# Patient Record
Sex: MALE | Race: WHITE
[De-identification: names, ages, dates, MRNs, and addresses within clinical notes are randomized per-mention and may not be internally consistent; named-entity substitution may affect disease eponyms.]

---

## 2017-10-18 ENCOUNTER — HOSPITAL ENCOUNTER (OUTPATIENT)
Dept: HOSPITAL 45 - C.LABSPEC | Age: 57
Discharge: HOME | End: 2017-10-18
Attending: INTERNAL MEDICINE
Payer: COMMERCIAL

## 2017-10-18 DIAGNOSIS — E78.5: ICD-10-CM

## 2017-10-18 DIAGNOSIS — R53.83: Primary | ICD-10-CM

## 2017-10-18 DIAGNOSIS — Z11.59: ICD-10-CM

## 2017-10-18 DIAGNOSIS — R30.0: ICD-10-CM

## 2017-10-18 LAB
ALBUMIN/GLOB SERPL: 1.3 {RATIO} (ref 0.9–2)
ALP SERPL-CCNC: 79 U/L (ref 45–117)
ALT SERPL-CCNC: 23 U/L (ref 12–78)
ANION GAP SERPL CALC-SCNC: 7 MMOL/L (ref 3–11)
AST SERPL-CCNC: 19 U/L (ref 15–37)
BASOPHILS # BLD: 0.03 K/UL (ref 0–0.2)
BASOPHILS NFR BLD: 0.5 %
BUN SERPL-MCNC: 13 MG/DL (ref 7–18)
BUN/CREAT SERPL: 13 (ref 10–20)
CALCIUM SERPL-MCNC: 8.9 MG/DL (ref 8.5–10.1)
CHLORIDE SERPL-SCNC: 106 MMOL/L (ref 98–107)
CHOLEST/HDLC SERPL: 4.6 {RATIO}
CO2 SERPL-SCNC: 26 MMOL/L (ref 21–32)
COMPLETE: YES
CREAT SERPL-MCNC: 1 MG/DL (ref 0.6–1.4)
EOSINOPHIL NFR BLD AUTO: 158 K/UL (ref 130–400)
GLOBULIN SER-MCNC: 3.4 GM/DL (ref 2.5–4)
GLUCOSE SERPL-MCNC: 102 MG/DL (ref 70–99)
GLUCOSE UR QL: 37 MG/DL
HCT VFR BLD CALC: 46.1 % (ref 42–52)
IG%: 0.2 %
IMM GRANULOCYTES NFR BLD AUTO: 24.9 %
LYMPHOCYTES # BLD: 1.36 K/UL (ref 1.2–3.4)
MCH RBC QN AUTO: 29.9 PG (ref 25–34)
MCHC RBC AUTO-ENTMCNC: 34.9 G/DL (ref 32–36)
MCV RBC AUTO: 85.5 FL (ref 80–100)
MONOCYTES NFR BLD: 6.8 %
NEUTROPHILS # BLD AUTO: 2.4 %
NEUTROPHILS NFR BLD AUTO: 65.2 %
NITRITE UR QL STRIP: 121 MG/DL (ref 0–150)
PH UR: 169 MG/DL (ref 0–200)
PMV BLD AUTO: 10.2 FL (ref 7.4–10.4)
POTASSIUM SERPL-SCNC: 4.2 MMOL/L (ref 3.5–5.1)
PSA SERPL-MCNC: 0.27 NG/ML (ref 0–4)
RBC # BLD AUTO: 5.39 M/UL (ref 4.7–6.1)
SODIUM SERPL-SCNC: 139 MMOL/L (ref 136–145)
TSH SERPL-ACNC: 1.87 UIU/ML (ref 0.3–4.5)
VERY LOW DENSITY LIPOPROT CALC: 24 MG/DL
WBC # BLD AUTO: 5.46 K/UL (ref 4.8–10.8)

## 2018-03-22 ENCOUNTER — HOSPITAL ENCOUNTER (INPATIENT)
Dept: HOSPITAL 45 - C.EDB | Age: 58
LOS: 2 days | Discharge: HOME | DRG: 552 | End: 2018-03-24
Attending: HOSPITALIST | Admitting: HOSPITALIST
Payer: COMMERCIAL

## 2018-03-22 VITALS
OXYGEN SATURATION: 98 % | SYSTOLIC BLOOD PRESSURE: 133 MMHG | HEART RATE: 57 BPM | DIASTOLIC BLOOD PRESSURE: 82 MMHG | TEMPERATURE: 98.24 F

## 2018-03-22 VITALS
WEIGHT: 301.15 LBS | HEIGHT: 73 IN | BODY MASS INDEX: 39.91 KG/M2 | BODY MASS INDEX: 39.91 KG/M2 | HEIGHT: 73 IN | WEIGHT: 301.15 LBS

## 2018-03-22 VITALS
DIASTOLIC BLOOD PRESSURE: 71 MMHG | SYSTOLIC BLOOD PRESSURE: 134 MMHG | OXYGEN SATURATION: 98 % | TEMPERATURE: 98.6 F | HEART RATE: 78 BPM

## 2018-03-22 DIAGNOSIS — G95.9: ICD-10-CM

## 2018-03-22 DIAGNOSIS — M47.26: ICD-10-CM

## 2018-03-22 DIAGNOSIS — R27.0: ICD-10-CM

## 2018-03-22 DIAGNOSIS — M54.5: ICD-10-CM

## 2018-03-22 DIAGNOSIS — G89.29: ICD-10-CM

## 2018-03-22 DIAGNOSIS — M47.22: Primary | ICD-10-CM

## 2018-03-22 DIAGNOSIS — M47.814: ICD-10-CM

## 2018-03-22 DIAGNOSIS — M50.121: ICD-10-CM

## 2018-03-22 LAB
ALBUMIN SERPL-MCNC: 4.3 GM/DL (ref 3.4–5)
ALP SERPL-CCNC: 87 U/L (ref 45–117)
ALT SERPL-CCNC: 24 U/L (ref 12–78)
AST SERPL-CCNC: 17 U/L (ref 15–37)
BASOPHILS # BLD: 0.05 K/UL (ref 0–0.2)
BASOPHILS NFR BLD: 0.8 %
BUN SERPL-MCNC: 11 MG/DL (ref 7–18)
CALCIUM SERPL-MCNC: 9.5 MG/DL (ref 8.5–10.1)
CK MB SERPL-MCNC: 2.8 NG/ML (ref 0.5–3.6)
CO2 SERPL-SCNC: 30 MMOL/L (ref 21–32)
CREAT SERPL-MCNC: 1.03 MG/DL (ref 0.6–1.4)
EOS ABS #: 0.07 K/UL (ref 0–0.5)
EOSINOPHIL NFR BLD AUTO: 144 K/UL (ref 130–400)
GLUCOSE SERPL-MCNC: 107 MG/DL (ref 70–99)
HCT VFR BLD CALC: 47.6 % (ref 42–52)
HGB BLD-MCNC: 17.1 G/DL (ref 14–18)
IG#: 0.01 K/UL (ref 0–0.02)
IMM GRANULOCYTES NFR BLD AUTO: 21.9 %
INR PPP: 1 (ref 0.9–1.1)
LYMPHOCYTES # BLD: 1.29 K/UL (ref 1.2–3.4)
MCH RBC QN AUTO: 30.9 PG (ref 25–34)
MCHC RBC AUTO-ENTMCNC: 35.9 G/DL (ref 32–36)
MCV RBC AUTO: 85.9 FL (ref 80–100)
MONO ABS #: 0.62 K/UL (ref 0.11–0.59)
MONOCYTES NFR BLD: 10.5 %
NEUT ABS #: 3.85 K/UL (ref 1.4–6.5)
NEUTROPHILS # BLD AUTO: 1.2 %
NEUTROPHILS NFR BLD AUTO: 65.4 %
PMV BLD AUTO: 9.5 FL (ref 7.4–10.4)
POTASSIUM SERPL-SCNC: 4 MMOL/L (ref 3.5–5.1)
PROT SERPL-MCNC: 7.8 GM/DL (ref 6.4–8.2)
PTT PATIENT: 24.7 SECONDS (ref 21–31)
RED CELL DISTRIBUTION WIDTH CV: 12.7 % (ref 11.5–14.5)
RED CELL DISTRIBUTION WIDTH SD: 40.4 FL (ref 36.4–46.3)
SODIUM SERPL-SCNC: 138 MMOL/L (ref 136–145)
WBC # BLD AUTO: 5.89 K/UL (ref 4.8–10.8)

## 2018-03-22 RX ADMIN — IBUPROFEN SCH MG: 600 TABLET, FILM COATED ORAL at 16:05

## 2018-03-22 RX ADMIN — SODIUM CHLORIDE SCH MLS/HR: 900 INJECTION, SOLUTION INTRAVENOUS at 05:12

## 2018-03-22 RX ADMIN — SODIUM CHLORIDE SCH MLS/HR: 900 INJECTION, SOLUTION INTRAVENOUS at 12:45

## 2018-03-22 RX ADMIN — IBUPROFEN SCH MG: 600 TABLET, FILM COATED ORAL at 20:20

## 2018-03-22 NOTE — HISTORY AND PHYSICAL
History & Physical


Date & Time of Service:


Mar 22, 2018 at 10:12


Chief Complaint:


Lwr Body Numb,Like Falling Asleep,Loosing Strength


Primary Care Physician:


Carlos Justice M.D.


History of Present Illness


Mr. Joshua was doing a lot of outdoor work that involved twisting, bending and 

lifting which he is not used to and then Sun, Mon, Tuesday he was driving 4-8 

hours a day. He does usually experience the feeling of legs "falling asleep" 

when he drives however it usually clears up after getting up out of the car. 

This time the feeling persists. He had some relief after seeing a chiropractor 

yesterday. Numbness went from both legs to just left leg. He feels like his leg 

might give out when he walks. He really has no pain and has taking Aleve last 

night and yesterday. No loss of control of bowel or bladder. He is eating and 

drinking normally. 





ROS


Constitutional: no chills, aches, sweats or fever


Respiratory: no sob,cough, sputum, or wheezing


Cardiac: no chest pain, palpitations, edema, orthopnea or lightheadedness


GI: no abdominal pain, nausea, vomiting, diarrhea or constipation


: no dysuria or hesitancy


Extremities: see HPI


Skin: no rash





All other systems reviewed and negative





Past Medical/Surgical History


Pmhx 18 years ago for chronic hydronephrosis, polyps on colonoscopy,





Family History


Father has htn, DMII 


Mother has A.fib and gout





Social History


Smoking Status:  Never Smoker


Smokeless Tobacco Use:  No


Alcohol Use:  none


Drug Use:  none


Marital Status:  


Housing status:  lives with family





Immunizations


History of Influenza Vaccine:  Yes


History of Tetanus Vaccine?:  Yes


History of Pneumococcal:  No


History of Hepatitis B Vaccine:  Yes





Allergies


Coded Allergies:  


     No Known Allergies (Verified , 2/13/03)





Home Medications


Scheduled


Ascorbic Acid (Ascorbic Acid), 500 MG PO DAILY


Fish Oil (Omega-3), 1 CAP PO DAILY


Multivitamin (Multivitamin), 1 TAB PO DAILY





Physical Exam


Vital Signs











  Date Time  Temp Pulse Resp B/P (MAP) Pulse Ox O2 Delivery O2 Flow Rate FiO2


 


3/22/18 09:32  67 20 137/62 97 Room Air  


 


3/22/18 08:09  66 18 127/82 98 Room Air  


 


3/22/18 07:27  76 18 129/72 95 Room Air  


 


3/22/18 06:00  62 16 152/88 96 Room Air  


 


3/22/18 05:41  64 18 146/79 98 Room Air  


 


3/22/18 05:37     99 Room Air  


 


3/22/18 05:15  78      


 


3/22/18 04:54 37.0 73 20 148/83 98 Room Air  











General: no distress


Eyes: normal inspection, PERLL


Respiratory: chest non tender, clear to auscultation, normal breath sounds, no 

respiratory distress, no accessory muscle use


Cardiac: regular rate and rhythm, no rub or gallop, no murmur, no edema, no jvd


GI/: active bowel sounds, no abd pain or tenderness, soft, non distended


Extremities: left leg with decreased rom/strength, 1/10 tenderness to palpation 

left hip/buttock, no pain or masses to palpation along spine or adjacent 

musculature, normal strength, non tender, uneven gait, able to walk on heels 

and toes though a bit wobbly with more difficulty on left than right. Able to 

feel palpation on left leg though somewhat numb with pins and needles,


Neuro/Psych: alert and oriented x 3, normal mood and affect, CN II-XII intact, 

lower extremity reflexes wnl and equal 


Skin: normal color, dry





Diagnostics


Laboratory Results





Results Past 24 Hours








Test


  3/22/18


05:10 3/22/18


09:28 Range/Units


 


 


White Blood Count 5.89  4.8-10.8  K/uL


 


Red Blood Count 5.54  4.7-6.1  M/uL


 


Hemoglobin 17.1  14.0-18.0  g/dL


 


Hematocrit 47.6  42-52  %


 


Mean Corpuscular Volume 85.9    fL


 


Mean Corpuscular Hemoglobin 30.9  25-34  pg


 


Mean Corpuscular Hemoglobin


Concent 35.9


  


  32-36  g/dl


 


 


Platelet Count 144  130-400  K/uL


 


Mean Platelet Volume 9.5  7.4-10.4  fL


 


Neutrophils (%) (Auto) 65.4   %


 


Lymphocytes (%) (Auto) 21.9   %


 


Monocytes (%) (Auto) 10.5   %


 


Eosinophils (%) (Auto) 1.2   %


 


Basophils (%) (Auto) 0.8   %


 


Neutrophils # (Auto) 3.85  1.4-6.5  K/uL


 


Lymphocytes # (Auto) 1.29  1.2-3.4  K/uL


 


Monocytes # (Auto) 0.62  0.11-0.59  K/uL


 


Eosinophils # (Auto) 0.07  0-0.5  K/uL


 


Basophils # (Auto) 0.05  0-0.2  K/uL


 


RDW Standard Deviation 40.4  36.4-46.3  fL


 


RDW Coefficient of Variation 12.7  11.5-14.5  %


 


Immature Granulocyte % (Auto) 0.2   %


 


Immature Granulocyte # (Auto) 0.01  0.00-0.02  K/uL


 


Prothrombin Time


  10.7


  


  9.0-12.0


SECONDS


 


Prothromb Time International


Ratio 1.0


  


  0.9-1.1  


 


 


Activated Partial


Thromboplast Time 24.7


  


  21.0-31.0


SECONDS


 


Partial Thromboplastin Ratio 1.0   


 


Sodium Level 138  136-145  mmol/L


 


Potassium Level 4.0  3.5-5.1  mmol/L


 


Chloride Level 103    mmol/L


 


Carbon Dioxide Level 30  21-32  mmol/L


 


Anion Gap 5.0  3-11  mmol/L


 


Blood Urea Nitrogen 11  7-18  mg/dl


 


Creatinine


  1.03


  


  0.60-1.40


mg/dl


 


Est Creatinine Clear Calc


Drug Dose 116.2


  


   ml/min


 


 


Estimated GFR (


American) 93.7


  


   


 


 


Estimated GFR (Non-


American 80.8


  


   


 


 


BUN/Creatinine Ratio 10.6  10-20  


 


Random Glucose 107  70-99  mg/dl


 


Calcium Level 9.5  8.5-10.1  mg/dl


 


Magnesium Level 2.3  1.8-2.4  mg/dl


 


Total Bilirubin 0.6  0.2-1  mg/dl


 


Direct Bilirubin 0.1  0-0.2  mg/dl


 


Aspartate Amino Transf


(AST/SGOT) 17


  


  15-37  U/L


 


 


Alanine Aminotransferase


(ALT/SGPT) 24


  


  12-78  U/L


 


 


Alkaline Phosphatase 87    U/L


 


Total Creatine Kinase 222    U/L


 


Creatine Kinase MB 2.8  0.5-3.6  ng/ml


 


Creatine Kinase MB Ratio 1.3  0-3.0  


 


Troponin I < 0.015  0-0.045  ng/ml


 


Total Protein 7.8  6.4-8.2  gm/dl


 


Albumin 4.3  3.4-5.0  gm/dl


 


Urine Color  YELLOW  


 


Urine Appearance  CLEAR CLEAR  


 


Urine pH  7.5 4.5-7.5  


 


Urine Specific Gravity  1.012 1.000-1.030  


 


Urine Protein  NEG NEG  


 


Urine Glucose (UA)  NEG NEG  


 


Urine Ketones  NEG NEG  


 


Urine Occult Blood  NEG NEG  


 


Urine Nitrite  NEG NEG  


 


Urine Bilirubin  NEG NEG  


 


Urine Urobilinogen  NEG NEG  


 


Urine Leukocyte Esterase  NEG NEG  











Impression


Assessment and Plan


Mr. Joshua is a 57 year old man here for left leg weakness





Left leg weakness


- admit obs med surg


- MRI T spine and C spine as patient has loss of sensation on right abdomen as 

well as left leg


- consult neuro


- PT/OT


- ibuprofen, tylenol for pain





Chronic left side hydronephrosis


- apparent since 2001, no need for acute intervention





Full code 


SCDs





Advanced Directives


Existing Advance Directive:  No


Existing Living Will:  No


Existing Power of :  No


Existing Health Care Proxy:  No





Resuscitation Status


full code





VTE Prophylaxis


Will order VTE Prophylaxis:  Yes





Reviewed:  Pt Seen/Exam by Me


History


CRNP supervision Note:





I interviewed and examined the patient. Discussed with SHERON Kaur and agree 

with findings and plan as documented in the note. Any exceptions or 

clarifications are listed here:





Patient is a 57-year-old male who presents with acute onset yesterday of left 

lower extremity weakness, and bilateral numbness and tingling in the lower 

extremities.  He is also had some pain in the left lower back that radiates 

through the left buttocks to the hip.  He reports taking several long car rides 

in the last week and typically gets paresthesias in his bilateral legs with 

long car rides that goes away when he gets up and walks around.  He did not 

have any long car rides yesterday and this occurred.  He went to the 

chiropractor and had his lower back manipulated which did help relieve the 

symptoms for a couple of hours, but then they returned.  He feels great 

difficulty in walking and feels like he is going to have his left leg give out.

  He denies neck pain, denies fevers or chills, denies headache.  He has no 

symptoms that he is aware of in his upper extremities.  He has no visual changes

, no sore throat, no shortness of breath or cough, no chest pain, no abdominal 

pain, no changes in bowel habits, no dysuria.





Other past history as above





Vital signs reviewed


Gen: AAOx3, NAD


HEENT: anicteric sclerae, EOMI


CV: RRR no mgr nl S1S2


Pulm: CTAB no wcr


Abd: +BS soft NT ND no masses or hernias


Ext: no edema, 2+ DP pulses


Skin: no rashes, warm/dry


Neuro: 5 out of 5 strength in the bilateral upper extremities, right lower 

extremity with 5 out of 5 strength throughout, left lower extremity with 4 out 

of 5 strength in the hip flexors, with knee extension and flexion, and great 

toe dorsiflexion; sensation intact to light touch throughout upper and lower 

extremities, but with pinprick testing, he is decreased on the right in the 

entire right lower extremity, as well as all the way up his anterior torso to 

just below the nipple around T5, he also has decreased sensation to pinprick in 

the right fifth finger and forearm, but is intact to pinprick at the right 

deltoid muscle; DTRs-brisk at 2-3+ in the patellar and Achilles bilaterally, as 

well as 2+ in the left biceps/triceps/brachial radialis, but unable to elicit 

DTR in the right biceps triceps and brachial radialis, gait is somewhat ataxic





All laboratory values reviewed and are within normal limits with regards to CBC

, PRP, TSH, Lyme disease negative, ESR normal, CRP normal, troponin negative, 

LFTs negative or normal, INR normal


MRI of the lumbar spine with some mild bulging disks but nothing significant





Patient is a 57-year-old male here with left lower extremity weakness, sensory 

deficit to the level of approximately T5 on the right side of his body, ataxia, 

concerning for thoracic or cervical spine lesion.





-Recommended to check cervical and thoracic spine MRIs with and without 

contrast to assess for spinal cord lesion-at the time of this dictation, the 

results were back and showed a lesion at the left C6-7 spinal cord level that 

was 6 x 3 mm that was not enhancing on postcontrast images.  This could be 

inflammatory versus encephalomalacia or demyelinating process


-We will check brain MRI with and without contrast


-We will make n.p.o. after midnight for LP in the morning-check routine studies 

as well as Lyme, MS panel


-Consider starting IV Solu-Medrol 1 g daily 3 days after LP as per neurology 

recommendations


-Discussed case with neurology, Dr. Carrington, at length on the phone with my findings


-Follow-up on RPR and B12 levels when available





Documented By:  Ruth Orozco

## 2018-03-22 NOTE — MEDICAL CONSULT
Consultation


Date of Consultation:


Mar 22, 2018.


Attending Physician:





Reason for Consultation:


left leg numbness/weakness


History of Present Illness


Mr. Joshua was doing a lot of outdoor work that involved twisting, bending and 

lifting which he is not used to and then Sun, Mon, Tuesday he was driving 4-8 

hours a day. He does usually experience the feeling of legs "falling asleep" 

when he drives however it usually clears up after getting up out of the car. 

This time the feeling persists. He had some relief after seeing a chiropractor 

yesterday. Numbness went from both legs to just left leg. He feels like his leg 

might give out when he walks. He really has no pain and has taking Aleve last 

night and yesterday. No loss of control of bowel or bladder. He is eating and 

drinking normally. 





ROS


Constitutional: no chills, aches, sweats or fever


Respiratory: no sob,cough, sputum, or wheezing


Cardiac: no chest pain, palpitations, edema, orthopnea or lightheadedness


GI: no abdominal pain, nausea, vomiting, diarrhea or constipation


: no dysuria or hesitancy


Extremities: see HPI


Skin: no rash





All other systems reviewed and negative





Past Medical/Surgical History


Pmhx 18 years ago for chronic hydronephrosis, polyps on colonoscopy,





Family History


Father has htn, DMII 


Mother has A.fib and gout





Social History


Smoking Status:  Never Smoker


Smokeless Tobacco Use:  No


Alcohol Use:  none


Drug Use:  none


Marital Status:  


Housing status:  lives with family





Immunizations


History of Influenza Vaccine:  Yes


History of Tetanus Vaccine?:  Yes


History of Pneumococcal:  No


History of Hepatitis B Vaccine:  Yes





Allergies


Coded Allergies:  


     No Known Allergies (Verified , 2/13/03)





Home Medications


Scheduled


Ascorbic Acid (Ascorbic Acid), 500 MG PO DAILY


Fish Oil (Omega-3), 1 CAP PO DAILY


Multivitamin (Multivitamin), 1 TAB PO DAILY





Social History


Smoking Status:  Never Smoker





Allergies


Coded Allergies:  


     No Known Allergies (Verified , 2/13/03)





Home Medications


Fish oil, multivitamin, vitamin C





Current Inpatient Medications





Current Inpatient Medications








 Medications


  (Trade)  Dose


 Ordered  Sig/Angel Luis


 Route  Start Time


 Stop Time Status Last Admin


Dose Admin


 


 Sodium Chloride  1,000 ml @ 


 50 mls/hr  Q20H


 IV  3/22/18 05:12


 4/21/18 05:11  3/22/18 05:12


50 MLS/HR











Physical Exam











  Date Time  Temp Pulse Resp B/P (MAP) Pulse Ox O2 Delivery O2 Flow Rate FiO2


 


3/22/18 09:32  67 20 137/62 97 Room Air  


 


3/22/18 08:09  66 18 127/82 98 Room Air  


 


3/22/18 07:27  76 18 129/72 95 Room Air  


 


3/22/18 06:00  62 16 152/88 96 Room Air  


 


3/22/18 05:41  64 18 146/79 98 Room Air  


 


3/22/18 05:37     99 Room Air  


 


3/22/18 05:15  78      


 


3/22/18 04:54 37.0 73 20 148/83 98 Room Air  








General: no distress


Eyes: normal inspection, PERLL


Respiratory: chest non tender, clear to auscultation, normal breath sounds, no 

respiratory distress, no accessory muscle use


Cardiac: regular rate and rhythm, no rub or gallop, no murmur, no edema, no jvd


GI/: active bowel sounds, no abd pain or tenderness, soft, non distended


Extremities: left leg with decreased rom/strength, 1/10 tenderness to palpation 

left hip/buttock, no pain or masses to palpation along spine or adjacent 

musculature, normal strength, non tender, uneven gait, able to walk on heels 

and toes though a bit wobbly with more difficulty on left than right. Able to 

feel palpation on left leg though somewhat numb with pins and needles,


Neuro/Psych: alert and oriented x 3, normal mood and affect, CN II-XII intact, 

lower extremity reflexes wnl and equal 


Skin: normal color, dry





Laboratory Results





Last 24 Hours








Test


  3/22/18


05:10 3/22/18


09:28


 


White Blood Count 5.89 K/uL  


 


Red Blood Count 5.54 M/uL  


 


Hemoglobin 17.1 g/dL  


 


Hematocrit 47.6 %  


 


Mean Corpuscular Volume 85.9 fL  


 


Mean Corpuscular Hemoglobin 30.9 pg  


 


Mean Corpuscular Hemoglobin


Concent 35.9 g/dl 


  


 


 


Platelet Count 144 K/uL  


 


Mean Platelet Volume 9.5 fL  


 


Neutrophils (%) (Auto) 65.4 %  


 


Lymphocytes (%) (Auto) 21.9 %  


 


Monocytes (%) (Auto) 10.5 %  


 


Eosinophils (%) (Auto) 1.2 %  


 


Basophils (%) (Auto) 0.8 %  


 


Neutrophils # (Auto) 3.85 K/uL  


 


Lymphocytes # (Auto) 1.29 K/uL  


 


Monocytes # (Auto) 0.62 K/uL  


 


Eosinophils # (Auto) 0.07 K/uL  


 


Basophils # (Auto) 0.05 K/uL  


 


RDW Standard Deviation 40.4 fL  


 


RDW Coefficient of Variation 12.7 %  


 


Immature Granulocyte % (Auto) 0.2 %  


 


Immature Granulocyte # (Auto) 0.01 K/uL  


 


Prothrombin Time 10.7 SECONDS  


 


Prothromb Time International


Ratio 1.0 


  


 


 


Activated Partial


Thromboplast Time 24.7 SECONDS 


  


 


 


Partial Thromboplastin Ratio 1.0  


 


Sodium Level 138 mmol/L  


 


Potassium Level 4.0 mmol/L  


 


Chloride Level 103 mmol/L  


 


Carbon Dioxide Level 30 mmol/L  


 


Anion Gap 5.0 mmol/L  


 


Blood Urea Nitrogen 11 mg/dl  


 


Creatinine 1.03 mg/dl  


 


Est Creatinine Clear Calc


Drug Dose 116.2 ml/min 


  


 


 


Estimated GFR (


American) 93.7 


  


 


 


Estimated GFR (Non-


American 80.8 


  


 


 


BUN/Creatinine Ratio 10.6  


 


Random Glucose 107 mg/dl  


 


Calcium Level 9.5 mg/dl  


 


Magnesium Level 2.3 mg/dl  


 


Total Bilirubin 0.6 mg/dl  


 


Direct Bilirubin 0.1 mg/dl  


 


Aspartate Amino Transf


(AST/SGOT) 17 U/L 


  


 


 


Alanine Aminotransferase


(ALT/SGPT) 24 U/L 


  


 


 


Alkaline Phosphatase 87 U/L  


 


Total Creatine Kinase 222 U/L  


 


Creatine Kinase MB 2.8 ng/ml  


 


Creatine Kinase MB Ratio 1.3  


 


Troponin I < 0.015 ng/ml  


 


Total Protein 7.8 gm/dl  


 


Albumin 4.3 gm/dl  


 


Urine Color  YELLOW 


 


Urine Appearance  CLEAR 


 


Urine pH  7.5 


 


Urine Specific Gravity  1.012 


 


Urine Protein  NEG 


 


Urine Glucose (UA)  NEG 


 


Urine Ketones  NEG 


 


Urine Occult Blood  NEG 


 


Urine Nitrite  NEG 


 


Urine Bilirubin  NEG 


 


Urine Urobilinogen  NEG 


 


Urine Leukocyte Esterase  NEG 











Assessment & Plan


Mr. Joshua is a 57 year old man here for left leg numbness and weakness 

following heavy outdoor work and three days of prolonged driving. 





Sciatica vis Meralgia paresthetica


- MRI did not show any acute changes, patient has had hydronephrosis since 2001


- Head CT negative for acute processes


- recommend discharge to home


- rest, NSAIDs, can continue chiropractor appointments as this did provide some 

relief


- may want to consider some outpatient PT especially as this is a recurrent 

issue

## 2018-03-22 NOTE — DIAGNOSTIC IMAGING REPORT
CERVICAL SPINE COMBO



HISTORY: Pain. Neuropathy.  T5 and below cervical sensory deficit on right,LLE

weakness



TECHNIQUE: Multiplanar multisequence MRI of the cervical spine was performed

both before and after the use of intravenous contrast.



COMPARISON STUDY:  None.



FINDINGS: Limited study technically due to severe patient motion. Normal signal

characteristics of the vertebral bodies. Moderate degenerative disc change

throughout. Signal characteristics of the cervical cord are remarkable for a

focus of increased signal involving the left lateral aspect of the cervical cord

at C6-C7. This shows no significant postcontrast enhancement. No additional

focal areas of increased signal are present. This measures 6 x 3.7 mm..



C2-C3: No significant central canal or neural foraminal narrowing.



C3-C4: Mild broad-based bulging disc. Minimal impact upon the anterior cervical

cord. Moderate osteophytic narrowing or right as well as left neural foramina.



C4-C5: Broad-based right posterior disc herniation. Moderate impact upon the

right anterior cervical cord. Significant narrowing right as well as left neural

foramina.



C5-C6: Right lateral bulging disc. Significant narrowing of the right

neuroforamina. Broad-based central component is in contact with but shows no

deformity of the cervical cord. Mild narrowing left neuroforamina.



C6-C7: Broad-based right central disc herniation. Considerable narrowing of the

right neural foramina. Moderate narrowing of the left neural foramina. Increased

signal left lateral aspect of the cervical cord showing no significant post

postcontrast enhancement.



C7-T1: Broad-based left central bulging disc. No impact upon the cervical cord.



IMPRESSION:  



1. Limited study technically due to considerable patient motion.

2. Moderate degenerative disc change throughout the entire cervical region.

3. Multilevel broad-based bulging discs with a right central posterior disc

herniation at C4-C5 and C6-C7.

4. Considerable narrowing of multilevel right and to lesser extent left neural

foramina as described.

5. Focus of increased signal left cervical cord C6-C7 measuring 6 x 3.7 mm.

6. This shows no abnormal postcontrast enhancement.

7. Differential considerations must include a demyelinating disorder, versus the

possibility of cord encephalomalacia due to a variety of entities. Transverse

myelitis is considered less likely.









The above report was generated using voice recognition software.  It may contain

grammatical, syntax or spelling errors.







Electronically signed by:  Luke Sumner M.D.

3/22/2018 8:04 PM



Dictated Date/Time:  3/22/2018 7:55 PM

## 2018-03-22 NOTE — DIAGNOSTIC IMAGING REPORT
HEAD CT NONCONTRAST



CT DOSE: 1228.53 mGy.cm



HISTORY: Left-sided body numbness.  Stroke



TECHNIQUE: Multiaxial CT images of the head were performed without the use of

intravenous contrast. Automated exposure control was utilized for this study.  A

dose lowering technique was utilized adhering to the principles of ALARA.



Comparison: None.



Findings: The paranasal sinuses and mastoid air cells are clear. The calvarium

and skull base are intact. The ventricles and sulci are within normal limits.

There is no mass, hematoma, midline shift, or acute infarct. Mild motion

artifact.



Impression:

Mild motion artifact. No acute intracranial abnormality. 







Electronically signed by:  Samir Chou M.D.

3/22/2018 7:38 AM



Dictated Date/Time:  3/22/2018 7:36 AM

## 2018-03-22 NOTE — DIAGNOSTIC IMAGING REPORT
THORACIC SPINE COMBO



HISTORY: Pain. Neuropathy.  sensory deficit T5 and below on right, LLE weakness



TECHNIQUE: Multiplanar multisequence MRI of the thoracic spine was performed

both before and after the intravenous administration of contrast.



COMPARISON:  None.



FINDINGS: 

Unremarkable signal characteristics of the vertebral bodies. Mild/moderate

degenerative disc changes throughout. Signal characteristics of the thoracic

spinal cord are unremarkable. No evidence for abnormal postcontrast enhancement.



Mild multilevel slight disc bulges with mild posterior osteophytic changes

throughout. No major compromise of the spinal canal or neural foramina. No

significant distention of the central canal.



IMPRESSION:  

Moderate degenerative disc change throughout the entire thoracic region. No

major compromise of the spinal canal or neural foramina. No abnormal

postcontrast enhancement. Minimal/mild multilevel disc bulges 









The above report was generated using voice recognition software.  It may contain

grammatical, syntax or spelling errors.







Electronically signed by:  Luke Sumner M.D.

3/22/2018 6:46 PM



Dictated Date/Time:  3/22/2018 6:42 PM

## 2018-03-22 NOTE — DIAGNOSTIC IMAGING REPORT
MRI LUMBAR SPINE W/O CONTRAST



CLINICAL HISTORY: severe left leg weakness    



TECHNIQUE: Sagittal and axial T1, T2 and STIR images were obtained.



COMPARISON STUDY:  No previous studies for comparison. 



OBSERVATIONS:



The vertebral bodies and posterior elements appear intact. There is no abnormal

bony signal present to suggest a marrow replacement process.



L1-2: There is a minor circumferential disc bulge. There is a slight triangular

configuration of the thecal sac but significant spinal stenosis is not felt to

be present. There is no significant foraminal narrowing



L2-3: There is a minor circumferential disc bulge. There is a very slight

triangle configuration of the thecal sac. Significant spinal stenosis is not

felt to be present. There is no significant foraminal narrowing



L3-4: There is a minor circumferential disc bulge. There is no significant

spinal or foraminal stenosis.



L4-5: There is a minimal circumferential disc bulge. There is no significant

spinal or foraminal stenosis.



L5-S1: No disc protrusions or extrusions. No evidence of spinal canal or neural

foraminal compromise.



There is rapid tapering of the thecal sac at the S1 level. This is likely

developmental.



The conus medullaris and cauda equina appear normal.



There is severe left-sided hydronephrosis. This likely is secondary to a

long-standing UPJ obstruction.



IMPRESSION: 

1. Mild multilevel spondylitic changes. No evidence of significant spinal or

foraminal stenosis.

2. Severe left-sided hydronephrosis, possibly secondary to a long-standing UPJ

obstruction

3. Rapid tapering of the thecal sac at the S1 level, likely developmental







Electronically signed by:  Ramiro Vasquez M.D.

3/22/2018 7:40 AM



Dictated Date/Time:  3/22/2018 7:33 AM

## 2018-03-22 NOTE — EMERGENCY ROOM VISIT NOTE
ED Visit Note


First contact with patient:  08:32


Patient case was signed out to me at 0700 hrs. by Dominique Somers PA-C.  This was 

pending the MRI.  The MRI of the lumbar spine does reveal severe left-sided 

hydronephrosis.  There are only minimal degenerative changes in the back.  No 

findings suggest neurologic compromise.  I did reexamine the patient, and he 

does have a gait abnormality.  He seems to be experiencing weakness in the left 

leg.  He is somewhat dragging this leg with walking.  There is only minimal 

pain overlying the left gluteal region by the sciatic nerve.  Although this 

certainly could be musculature spasm in the gluteal region/low back causing 

sciatic radicular symptoms, because of his appreciated deficit of weakness as 

well as gait abnormality with very minimal pain do believe that further 

evaluation and management in the inpatient setting is warranted.  I did consult 

the admission team.  Please refer to for the documentation regarding his stay. 

No evidence of CVA on exam.

## 2018-03-23 VITALS — SYSTOLIC BLOOD PRESSURE: 124 MMHG | OXYGEN SATURATION: 96 % | HEART RATE: 68 BPM | DIASTOLIC BLOOD PRESSURE: 80 MMHG

## 2018-03-23 VITALS
TEMPERATURE: 98.24 F | SYSTOLIC BLOOD PRESSURE: 138 MMHG | HEART RATE: 67 BPM | OXYGEN SATURATION: 96 % | DIASTOLIC BLOOD PRESSURE: 84 MMHG

## 2018-03-23 VITALS
DIASTOLIC BLOOD PRESSURE: 85 MMHG | HEART RATE: 64 BPM | TEMPERATURE: 98.24 F | OXYGEN SATURATION: 98 % | SYSTOLIC BLOOD PRESSURE: 147 MMHG

## 2018-03-23 VITALS
HEART RATE: 62 BPM | SYSTOLIC BLOOD PRESSURE: 126 MMHG | DIASTOLIC BLOOD PRESSURE: 82 MMHG | TEMPERATURE: 98.6 F | OXYGEN SATURATION: 97 %

## 2018-03-23 VITALS
OXYGEN SATURATION: 96 % | DIASTOLIC BLOOD PRESSURE: 81 MMHG | HEART RATE: 73 BPM | TEMPERATURE: 98.6 F | SYSTOLIC BLOOD PRESSURE: 160 MMHG

## 2018-03-23 VITALS
SYSTOLIC BLOOD PRESSURE: 129 MMHG | TEMPERATURE: 98.24 F | DIASTOLIC BLOOD PRESSURE: 69 MMHG | OXYGEN SATURATION: 96 % | HEART RATE: 71 BPM

## 2018-03-23 VITALS — OXYGEN SATURATION: 96 % | DIASTOLIC BLOOD PRESSURE: 77 MMHG | HEART RATE: 70 BPM | SYSTOLIC BLOOD PRESSURE: 118 MMHG

## 2018-03-23 LAB
BUN SERPL-MCNC: 13 MG/DL (ref 7–18)
CALCIUM SERPL-MCNC: 8.8 MG/DL (ref 8.5–10.1)
CO2 SERPL-SCNC: 28 MMOL/L (ref 21–32)
CREAT SERPL-MCNC: 0.98 MG/DL (ref 0.6–1.4)
EOSINOPHIL NFR BLD AUTO: 135 K/UL (ref 130–400)
GLUCOSE CSF-MCNC: 59 MG/DL (ref 40–70)
GLUCOSE SERPL-MCNC: 108 MG/DL (ref 70–99)
HCT VFR BLD CALC: 45.1 % (ref 42–52)
HGB BLD-MCNC: 16.3 G/DL (ref 14–18)
MCH RBC QN AUTO: 30.8 PG (ref 25–34)
MCHC RBC AUTO-ENTMCNC: 36.1 G/DL (ref 32–36)
MCV RBC AUTO: 85.1 FL (ref 80–100)
PMV BLD AUTO: 9.3 FL (ref 7.4–10.4)
POTASSIUM SERPL-SCNC: 4.2 MMOL/L (ref 3.5–5.1)
PROT CSF-MCNC: 49 MG/DL (ref 15–45)
RED CELL DISTRIBUTION WIDTH CV: 12.9 % (ref 11.5–14.5)
RED CELL DISTRIBUTION WIDTH SD: 39.6 FL (ref 36.4–46.3)
SODIUM SERPL-SCNC: 139 MMOL/L (ref 136–145)
WBC # BLD AUTO: 6.45 K/UL (ref 4.8–10.8)

## 2018-03-23 PROCEDURE — 009U3ZX DRAINAGE OF SPINAL CANAL, PERCUTANEOUS APPROACH, DIAGNOSTIC: ICD-10-PCS | Performed by: RADIOLOGY

## 2018-03-23 RX ADMIN — Medication SCH TAB: at 17:43

## 2018-03-23 RX ADMIN — METHYLPREDNISOLONE SODIUM SUCCINATE SCH MLS/HR: 1 INJECTION, POWDER, FOR SOLUTION INTRAMUSCULAR; INTRAVENOUS at 16:21

## 2018-03-23 RX ADMIN — METHYLPREDNISOLONE SODIUM SUCCINATE SCH MLS/HR: 1 INJECTION, POWDER, FOR SOLUTION INTRAMUSCULAR; INTRAVENOUS at 21:09

## 2018-03-23 RX ADMIN — Medication SCH GM: at 17:43

## 2018-03-23 RX ADMIN — OXYCODONE HYDROCHLORIDE AND ACETAMINOPHEN SCH MG: 500 TABLET ORAL at 17:43

## 2018-03-23 NOTE — DIAGNOSTIC IMAGING REPORT
FLUOROSCOPICALLY GUIDED LUMBAR PUNCTURE



CLINICAL HISTORY:  C6-7 cord lesion



FLUOROSCOPY TIME: 0.1 minute. A single fluoroscopic spot image.



PROCEDURE:  The procedure, risks and benefits were discussed with the patient

including the risk of spinal headache, bleeding and infection. The patient

agreed to the procedure and informed written consent was obtained. The procedure

was performed by Dr. Chou following a timeout. The left L5-S1 interlaminar

space was targeted. Skin overlying the space was prepped and draped in the usual

sterile fashion and local anesthesia was achieved with 1% lidocaine. Under

intermittent fluoroscopic guidance, a 20-gauge x 3 1/2 in. Sprotte needle was

inserted into the thecal sac. A total of 10 cc of clear, colorless cerebral

spinal fluid was obtained and spread amongst 4 vials. The patient tolerated the

procedure well. There were no immediate complications. The specimens were sent

to the laboratory at the request of the referring physician.



IMPRESSION: Successful fluoroscopic guided lumbar puncture with removal of 10 cc

of clear, colorless cerebral spinal fluid. No immediate complications.







Electronically signed by:  Samir Chou M.D.

3/23/2018 2:04 PM



Dictated Date/Time:  3/23/2018 2:04 PM

## 2018-03-23 NOTE — HOSPITALIST PROGRESS NOTE
Hospitalist Progress Note


Date of Service


Mar 23, 2018.


 (China Swanson PA-C)





Subjective


Pt evaluation today including:  conversation w/ patient, conversation w/ family

, physical exam, chart review, lab review, review of studies


Pain:  L buttocks pain with ambulation


PO Intake:  Good


Voiding:  no voiding problems


The patient was seen and examined this morning. Pts two sons are present at 

bedside.  He reports feeling better today compared to yesterday. Denies any 

numbness or tingling going down into the le he is having some ft leg.  He has 

been ambulating very minimally at bedside.  Minimal left shoulder blade tension/

deep pain that feels sore, he denies any sharp shooting or referred pain.  Pain 

is not related to position. He normally takes aleve once daily for muscle pain 

on a regular basis.  


He is anticipating LP which is to be done early afternoon today.  


The patient was seen by neurology early this morning.  


MRI of the brain has since been completed and is negative for any acute 

findings.





   Additional Comments:


Constitutional: No fever, sweats or chills


Eyes: No diplopia, no worsening or blurred vision


ENT: normal hearing, no trouble swallowing


Respiratory: No cough, sputum, dyspnea at rest or on exertion


Cardiovascular: No chest pain, tightness or palpitations


Abdomen: No pain, nausea, vomiting, diarrhea or constipation


Musculoskeletal: Left hip slightly sore, L shoulder blade deep soreness/ache, 

no numbness or tingling, able to ambulate without assistive devices, No calf 

pain, swelling


Neurologic: No weakness, numbness/tingling, or balance problems


Psychiatric: No anxiety or depression


Skin: No rash or itch normal


 (China Swanson, ARON)





Objective


Vital Signs











  Date Time  Temp Pulse Resp B/P (MAP) Pulse Ox O2 Delivery O2 Flow Rate FiO2


 


3/23/18 07:57      Room Air  


 


3/23/18 07:34 36.8 67 18 138/84 (102) 96 Room Air  


 


3/23/18 00:00      Room Air  


 


3/23/18 00:00 36.8 64 20 147/85 (105) 98 Room Air  


 


3/22/18 16:30      Room Air  


 


3/22/18 15:01 36.8 57 18 133/82 (99) 98 Room Air  


 


3/22/18 12:30 37.0 78 18 134/71 98 Room Air  


 


3/22/18 11:14  68 18 130/72 98 Room Air  


 


3/22/18 09:32  67 20 137/62 97 Room Air  








 (China Swanson PA-C)





Physical Exam


Notes:


General: awake, alert, no apparent distress, morbidly obese


Head: Normocephalic, atraumatic


ENT: PERRL, EOMI, no pharyngeal exudate, mucous membranes moist


Chest: Clear to auscultation, on room air, no adventitious breath sounds


Cardiac: Regular rate and rhythm, no murmur, no JVD, normal peripheral pulses, 

good capillary refill


Abdominal: NABS x 4 quadrants, soft, nontender to palpation, no rebound, 

guarding or tenderness


Extremities: Normal inspection, no peripheral edema or erythema, calfs 

nontender to palpation. no point tenderness over L shoulder region, no muscle 

spasm of paraspinal muscles.


Psych: Normal mood and affect


Neuro: Gait examined - pt favoring the right leg with offloading of leg leg 

pressure, AAO x 3, strength intact bilaterally and related 5/5, no motor 

deficits, speech is clear, no peripheral sensory deficits





 (China Swanson PA-C)





Laboratory Results





Last 24 Hours








Test


  3/22/18


09:28 3/22/18


12:33 3/23/18


04:44 3/23/18


07:54


 


Urine Color YELLOW    


 


Urine Appearance CLEAR    


 


Urine pH 7.5    


 


Urine Specific Gravity 1.012    


 


Urine Protein NEG    


 


Urine Glucose (UA) NEG    


 


Urine Ketones NEG    


 


Urine Occult Blood NEG    


 


Urine Nitrite NEG    


 


Urine Bilirubin NEG    


 


Urine Urobilinogen NEG    


 


Urine Leukocyte Esterase NEG    


 


Vitamin B12 Level  536 pg/mL   


 


White Blood Count    6.45 K/uL 


 


Red Blood Count    5.30 M/uL 


 


Hemoglobin    16.3 g/dL 


 


Hematocrit    45.1 % 


 


Mean Corpuscular Volume    85.1 fL 


 


Mean Corpuscular Hemoglobin    30.8 pg 


 


Mean Corpuscular Hemoglobin


Concent 


  


  


  36.1 g/dl 


 


 


RDW Standard Deviation    39.6 fL 


 


RDW Coefficient of Variation    12.9 % 


 


Platelet Count    135 K/uL 


 


Mean Platelet Volume    9.3 fL 


 


Sodium Level    139 mmol/L 


 


Potassium Level    4.2 mmol/L 


 


Chloride Level    106 mmol/L 


 


Carbon Dioxide Level    28 mmol/L 


 


Anion Gap    5.0 mmol/L 


 


Blood Urea Nitrogen    13 mg/dl 


 


Creatinine    0.98 mg/dl 


 


Est Creatinine Clear Calc


Drug Dose 


  


  


  122.1 ml/min 


 


 


Estimated GFR (


American) 


  


  


  99.5 


 


 


Estimated GFR (Non-


American 


  


  


  85.8 


 


 


BUN/Creatinine Ratio    13.3 


 


Random Glucose    108 mg/dl 


 


Calcium Level    8.8 mg/dl 








 (China Swanson PA-C)





Assessment and Plan


This is a 58 yo M with PMhx of





LLE weakness


- Imaging completed including CT cervical, thoracic and lumbar spine 


   Cervical CT: lesions from C6-C7 involving the left cervical cord measures 6 

x 3.7 mm, moderate degenerative disc change throughout the entire cervical 

region. Multilevel broad-based bulging discs with a right central posterior 

disc herniation at C4-C5 and C6-C7.


   Thoracic CT: moderate degenerative disc change throughout the entire 

thoracic region. 


   Lumbar CT: Mild multilevel spondylitic changes. No evidence of significant 

spinal or foraminal stenosis. Severe left-sided hydronephrosis, possibly 

secondary to a long-standing UPJ obstruction 3. Rapid tapering of the thecal 

sac at the S1 level, likely developmental


- Neurology consulted - appreciate recs - no need for spinal surgeon consult at 

this time. 


- MRI brain completed this morning and is also negative for acute findings. 


- Possible that this is all post traumatic from heavy lifting vs demyelinating 

process such as MS


- LP completed today: Follow [employer], cryptococcal, fungal and gram stain 


   - Consider 1 g IV solumedrol per neuro for for anti-inflammatory properties 

following the LP, 1 week tapering dose.


- No wbc to suggest infectious etiology, follow PRP and CBC


- Analgesia with tylenol and ibuprofen prn, ice


- PT/OT consulted - normally ambulates without assistance





DVT ppx: Ambulatory





CODE STATUS: FULL CODE





Disposition: From home, lives with wife


 (China Swanson PA-C)


Attending Note & Attestation -


Pt seen/examined, chart reviewed, care plan d/w SERGIO Swanson.  


I agree w/ the key components of her progress note.





Pt's paresthesias & motor weakness are all improved.


Had LP - I saw him post-procedure - he was w/o complaints. 


No new neuro symptoms.





VSS


no fever





gen - nad


heart - RRR


lungs - CTA b/l 


abd - soft, NT


ext - no edema


neuro - strength 5/5 x 4 exts; sensation intact to light touch x 4 exts





LP - cell counts -


0 WBCs


2 RBCs


49 total protein 


gram stain negative





MRI brain w/o lesions/tumor/mass/stroke(s)





A/P:


Paresthesias/motor weakness - resolving.  Felt to be due to DJD.


Extensive w/u thus far negative except for considerable DJD throughout the 

entire spine and a C6-C7 lesion on the left, felt to be chronic by neurology.


No evidence of lyme's, thyroid disease, low B12, syphilis, etc.  


MS felt to be unlikely.  





1 gm of solumedrol over the next 24 hours.


hopefully home tomorrow after such. 


f/u Dr. Carrington as outpatient 





MARKUS NOLAND MD


 (Nirav Noland MD)

## 2018-03-23 NOTE — NEUROLOGY CONSULTATION
Neurology Consultation


Date of Consultation:


Mar 23, 2018.


Attending Physician:


Nirav Moran MD


Primary Care Physician:


Carlos Justice M.D.


Reason for Consultation:


Patient is a 57-year-old, who was asked to see the request of Dr. Orozco and 

Beth Kaur PA-C, for neurologic consultation regarding weakness and 

numbness.


History of Present Illness


Source:  patient, caregiver, spouse, hospital records


Patient tells me that he has had longstanding intermittent low back pain. In 

high school he played football and wrestled at [employer], and head 

multiple concussions and injuries.


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 

greater hit in the movable rock and he was jaw hard and thrown from his seat 

striking his occiput put on a protective roll bar.  This created a loss of 

consciousness and he had significant headache and neck pain for several days to 

weeks thereafter.  He has had multiple injuries with his occupation and has had 

chronic low back pain intermittently over time.  When he sits too long, such as 

in a car, he will get increased low back pain. He typically does not have neck 

pain.  He does not have any significant upper extremity issues except for some 

discomfort along the ulnar forearm.  He has intermittent leg weakness at times 

his low back gets particularly worse.  When he sleeps at night he can have arm 

numbness and after waking up he will shake his arm and the numbness will 

resolve within a few minutes.  This never happens to his legs. He gets no arm 

numbness during the daytime.





He was in his usual state of health when he did a considerable amount of work 

at his place of residence on March 17th.  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 

and made 100 of these bags over the course of the day.  He was doing a 

considerable amount of bending, lifting, gripping, and twisting.  He had some 

low back pain thereafter.  The next day he spent a total of 5 hours in the car 

and had some low back pain while sitting.  On March 19th and 20th he again had 

long car rides for his employment and had low back pain and some left lower 

extremity tightness.


On March 21st he was doing a lot of heavy lifting and shuffled considerable 

amount of snow as well.  He had leg weakness bilaterally but no numbness in the 

limbs and no neck pain. He saw a chiropractor that evening who adjusted his low 

back and this helps some.


On March 22nd he woke up in the middle the night with some tingling in his 

right lower extremity and weakness in his left lower extremity and trouble 

walking.





He arrived to the emergency room on March 22nd at 0454 hours with a temperature 

37.0, pulse 73, respiratory 20, and blood pressure 148/83.


He was noted to have some left lower extremity weakness.


CT scan of the head was unremarkable.


Laboratory studies revealed a normal CBC, Chem profile, ESR, B12, TSH, RPR, and 

Lyme antibody titers.


MRI of the lumbar spine showed some diffuse degenerative changes with no 

significant stenosis.  He has neural foraminal stenosis diffusely.





Patient had MRI of the thoracic spine which showed diffuse degenerative changes 

of disc in bone with no significant stenoses or other abnormalities.


MRI of the cervical spine showed significant spinal stenosis with impingement 

of the cord (but no compression) at C4-5.  There was a linear lesion of about 6 

millimeters at the C6-7 level in the cord off with midline. Etiology of this is 

unknown and it did not enhance.  He has severe right greater than left diffuse 

neural foraminal stenosis in the spinal cord.


I reviewed all 3 MRI films and reports with the patient and his wife who is at 

bedside.





This morning, patient feels that he has no tingling or weakness in his arms.  

Left lower extremity is stronger than yesterday but still little bit weak.  His 

balance is better going up to the bathroom.  He has no incontinence of urine 

and he only has mild low back discomfort.





Past Medical/Surgical History


Chronic low back pain


Left lower extremity weakness


Cervical spinal stenosis from disc in bone with intramedullary C6-7 lesion of a 

nonenhancing nature of uncertain etiology.





Hydronephrosis seen on imaging study


No history of renal stones, heart disease, diabetes, hypertension, dyslipidemia

, stroke, asthma, or ulcer disease.


Post tonsillectomy and wisdom teeth.





Family History


Mother, age 77, has a history of atrial fibrillation, gout, and diverticulitis.

  Father age 75, has diabetes.





Social History


Patient has never used tobacco products or smoke.  He does not consume alcohol.

  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 

materials


Smoking Status:  Never smoker


Smokeless Tobacco Use:  No


Alcohol Use:  none


Drug Use:  none


Marital Status:  


Housing Status:  lives with family


Occupation Status:  employed





Allergies


Coded Allergies:  


     No Known Allergies (Verified , 2/13/03)





Current Inpatient Medications





Current Inpatient Medications








 Medications


  (Trade)  Dose


 Ordered  Sig/Angel Luis


 Route  Start Time


 Stop Time Status Last Admin


Dose Admin


 


 Acetaminophen


  (Tylenol Tab)  650 mg  Q4H  PRN


 PO  3/22/18 11:30


 4/21/18 11:29   


 


 


 Polyethylene


  (Miralax Powder


 Packet)  17 gm  DAILY  PRN


 PO  3/22/18 11:30


 4/21/18 11:29   


 


 


 Miscellaneous


  (Iv Fluids


 Completed)  1 ea  PRN  PRN


 N/A  3/22/18 16:30


 3/22/19 16:29   


 


 


 Gadobutrol


  (Gadavist)  13.5 mmol  UD  PRN


 IV  3/22/18 18:30


 3/26/18 18:29   


 











Review of Systems


Constitutional:  + weakness, No fever, No fatigue


Eyes:  No worsening of vision, No diplopia


ENT:  No hearing loss, No tinnitus, No trouble swallowing


Respiratory:  No cough, No shortness of breath


Cardiovascular:  No chest pain, No palpitations


Abdomen:  No pain, No nausea


Musculoskeletal:  + joint pain, No muscle pain


Genitourinary - Male:  + urinary frequency, No dysuria, No urinary incontinence


Neurologic:  + weakness, No memory loss, No numbness/tingling, No vertigo, No 

balance problems


Psychiatric:  No depression symptoms, No anxiety


Endocrine:  No fatigue


Hematologic / Lymphatic:  No abnormal bleeding/bruising


Integumentary:  No rash


Allergic / Immunologic:  No hives





Physical Exam


Vital Signs (Past 24 Hrs):











  Date Time  Temp Pulse Resp B/P (MAP) Pulse Ox O2 Delivery O2 Flow Rate FiO2


 


3/23/18 07:57      Room Air  


 


3/23/18 07:34 36.8 67 18 138/84 (102) 96 Room Air  


 


3/23/18 00:00      Room Air  


 


3/23/18 00:00 36.8 64 20 147/85 (105) 98 Room Air  


 


3/22/18 16:30      Room Air  


 


3/22/18 15:01 36.8 57 18 133/82 (99) 98 Room Air  


 


3/22/18 12:30 37.0 78 18 134/71 98 Room Air  


 


3/22/18 11:14  68 18 130/72 98 Room Air  


 


3/22/18 09:32  67 20 137/62 97 Room Air  








Patient is right-handed.





The patient is awake and alert. Speech is normal without aphasia or dysarthria. 

Mentation and thought processes are intact with orientation and normal fund of 

knowledge. Mood and affect are normal and appropriate. Appearance and grooming 

are normal.  Long and short-term memory are intact.





The discs are sharp with positive venous pulsations.  There are no exudates, 

hemorrhages, or blood vessel changes seen.  Pupils are 4mm bilaterally and 

reactive to light.  Extraocular eye muscles are intact without nystagmus.  

Visual acuity and visual fields seem normal grossly to confrontation.





There are no deficits to sensation of the face bilaterally.  Corneal reflexes 

are positive bilaterally.  Facial strength and symmetry is normal bilaterally.  

Hearing seems intact grossly to voice and finger rub.  Palate moves well 

without asymmetry.  There is normal sternocleidomastoid and trapezius strength 

bilaterally.  Tongue is midline with good strength bilaterally.





Neck has a somewhat limited range of motion although it is supple and he has 

some tenderness with turn and tilt to the right.  There are no cervical bruits.

  There are no cranial or ocular bruits.





Heart is without murmur.





Cervical, thoracic, and lumbar spine are nontender to palpation, including the 

spinous processes and paraspinal muscles..





Gait is normal.  There is good arm swing, turn, stance, and balance.  





With outstretched arms there is no drift.  There are no resting, postural, or 

action tremors.  There is no ataxia with finger-to-nose testing.  There is good 

facility in the hands. There are no abnormal involuntary movements noted.





Motor strength is 5/5 in the arms bilaterally including deltoids, biceps, 

brachioradialis, and wrist flexors and extensors muscles.  Intrinsic hand 

muscles and  are 5/5 on the right and 4+/5 on the left.  No atrophy is 

noted in the left hand however.  Motor strength is 5/5 in the legs bilaterally 

including  hamstring, gastrocnemius, tibialis anterior, tibialis posterior, and 

peroneii muscles bilaterally. The left hip flexors and quadriceps muscles are 4 

to 4+/5 and the right are 5/5.  Again, there is no atrophy noted in the left 

compared to the right.  Toe extensors are normal and there is good bulk in the 

extensor digitorum brevis muscle bilaterally.





The limbs have good tone without rigidity or spasticity, and there is no 

atrophy noted.  Muscle bulk is normal, there is no tenderness, no myotonia 

noted to percussion, and no fasciculations seen. 





Sensory examination is intact to  touch throughout all four limbs.  However, 

there is a noticeable decreased sensation to pinprick in the entire right lower 

extremity from toes to hip, the right side of the trunk anteriorly and 

posteriorly to the T5 level.  There is mild  vibratory sense loss in the feet 

bilaterally.





Reflexes are 2/4 in the biceps, triceps, brachioradialis, and Achilles tendons 

bilaterally. Quadriceps tendon reflexes are 3/4 bilaterally. There is 1-2 beats 

of clonus with passive stretch of the ankle on the left but not the right which 

has no clonus.





Toes are downgoing with plantar stimulation bilaterally.





Peripheral pulses are present and of normal quality distally in all four limbs. 

There is no peripheral edema noted.





Laboratory Results


Past 24 Hours:


3/23/18 07:54








3/23/18 07:54

















Test


  3/22/18


09:28 3/22/18


12:33 3/23/18


04:44 3/23/18


07:54


 


Urine Color YELLOW    


 


Urine Appearance CLEAR (CLEAR)    


 


Urine pH 7.5 (4.5-7.5)    


 


Urine Specific Gravity


  1.012


(1.000-1.030) 


  


  


 


 


Urine Protein NEG (NEG)    


 


Urine Glucose (UA) NEG (NEG)    


 


Urine Ketones NEG (NEG)    


 


Urine Occult Blood NEG (NEG)    


 


Urine Nitrite NEG (NEG)    


 


Urine Bilirubin NEG (NEG)    


 


Urine Urobilinogen NEG (NEG)    


 


Urine Leukocyte Esterase NEG (NEG)    


 


Vitamin B12 Level


  


  536 pg/mL


(211-911) 


  


 


 


Red Blood Count


  


  


  


  5.30 M/uL


(4.7-6.1)


 


Mean Corpuscular Volume


  


  


  


  85.1 fL


()


 


Mean Corpuscular Hemoglobin


  


  


  


  30.8 pg


(25-34)


 


Mean Corpuscular Hemoglobin


Concent 


  


  


  36.1 g/dl


(32-36)


 


RDW Standard Deviation


  


  


  


  39.6 fL


(36.4-46.3)


 


RDW Coefficient of Variation


  


  


  


  12.9 %


(11.5-14.5)


 


Mean Platelet Volume


  


  


  


  9.3 fL


(7.4-10.4)


 


Anion Gap


  


  


  


  5.0 mmol/L


(3-11)


 


Est Creatinine Clear Calc


Drug Dose 


  


  


  122.1 ml/min 


 


 


Estimated GFR (


American) 


  


  


  99.5 


 


 


Estimated GFR (Non-


American 


  


  


  85.8 


 


 


BUN/Creatinine Ratio    13.3 (10-20) 


 


Calcium Level


  


  


  


  8.8 mg/dl


(8.5-10.1)











Imaging


CERVICAL SPINE COMBO





HISTORY: Pain. Neuropathy.  T5 and below cervical sensory deficit on right,LLE


weakness





TECHNIQUE: Multiplanar multisequence MRI of the cervical spine was performed


both before and after the use of intravenous contrast.





COMPARISON STUDY:  None.





FINDINGS: Limited study technically due to severe patient motion. Normal signal


characteristics of the vertebral bodies. Moderate degenerative disc change


throughout. Signal characteristics of the cervical cord are remarkable for a


focus of increased signal involving the left lateral aspect of the cervical cord


at C6-C7. This shows no significant postcontrast enhancement. No additional


focal areas of increased signal are present. This measures 6 x 3.7 mm..





C2-C3: No significant central canal or neural foraminal narrowing.





C3-C4: Mild broad-based bulging disc. Minimal impact upon the anterior cervical


cord. Moderate osteophytic narrowing or right as well as left neural foramina.





C4-C5: Broad-based right posterior disc herniation. Moderate impact upon the


right anterior cervical cord. Significant narrowing right as well as left neural


foramina.





C5-C6: Right lateral bulging disc. Significant narrowing of the right


neuroforamina. Broad-based central component is in contact with but shows no


deformity of the cervical cord. Mild narrowing left neuroforamina.





C6-C7: Broad-based right central disc herniation. Considerable narrowing of the


right neural foramina. Moderate narrowing of the left neural foramina. Increased


signal left lateral aspect of the cervical cord showing no significant post


postcontrast enhancement.





C7-T1: Broad-based left central bulging disc. No impact upon the cervical cord.





IMPRESSION:  





1. Limited study technically due to considerable patient motion.


2. Moderate degenerative disc change throughout the entire cervical region.


3. Multilevel broad-based bulging discs with a right central posterior disc


herniation at C4-C5 and C6-C7.


4. Considerable narrowing of multilevel right and to lesser extent left neural


foramina as described.


5. Focus of increased signal left cervical cord C6-C7 measuring 6 x 3.7 mm.


6. This shows no abnormal postcontrast enhancement.


7. Differential considerations must include a demyelinating disorder, versus the


possibility of cord encephalomalacia due to a variety of entities. Transverse


myelitis is considered less likely.














The above report was generated using voice recognition software.  It may contain


grammatical, syntax or spelling errors.











Electronically signed by:  Luke Sumner M.D.


3/22/2018 8:04 PM





Impression


1. Acute onset left lower extremity weakness with low back pain.





I believe the patient has a mid lumbar radiculopathy giving him proximal left 

leg weakness.


Imaging studies shows no surgical lesion but he has diffuse degenerative 

changes of disc in bone in the thoracic and lumbar spine.





2.  Significant cervical spinal stenosis at C4-5 secondary to disc in bone.





Cord is impinged but not compressed.  There are other degenerative changes and 

there is significant right greater than left neural foraminal stenosis 

diffusely bilaterally


The left hand is weaker than the right which I suspect is a left C8 

radiculopathy.





3. 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





The etiology of this lesion is not clear.  An inflammatory process cannot be 

excluded including Lyme disease.  He has no history to suggest multiple 

sclerosis or any other acute infectious/inflammatory process.  This does not 

look like transverse myelitis.


There could be some encephalomalacia or mechanical aspect of this lesion from 

previous injury but there is no gross atrophy noted.





He has no upper motor neuron signs in the lower extremities otherwise.  I do 

not believe he has an acute myelopathy.  The the quadriceps reflexes are brisk 

but symmetrical. The more distal reflexes are not as brisk and there are no toe 

extensor signs or clonus present.





Plan


1.  Lumbar puncture to evaluate for CNS inflammatory disease.  Check for CNS 

Lyme disease as well.





2.  MRI of the brain to evaluate for other white matter lesions.





3.  Physical therapy for strengthening of the left leg and hand





4. Avoid any stress to the cervical spine (no chiropractic manipulation and no 

heavy lifting at work).





5. Consider 1 gram IV Solu-Medrol following the lumbar puncture and follow this 

with a 1 week tapering course of steroids for anti-inflammatory effect.





6. Depending on his clinical course and the above test results, I would hold 

off on surgical consultation for cervical spine at this time.





I can follow up as an outpatient.





I spent a total of 145 minutes caring for this patient, including discussion 

with Radiology reviewing all films, discussing the case with his primary care 

team in the hospital, records review, and direct patient evaluation at bedside.

  I also spent considerable time discussing the films and diagnoses and 

treatment plan with the patient and his wife at bedside.

## 2018-03-23 NOTE — EMERGENCY ROOM VISIT NOTE
History


First contact with patient:  05:03


Chief Complaint:  NEURO SYMPTOMS


Stated Complaint:  LWR BODY NUMB,LIKE FALLING ASLEEP,LOOSING STRENGTH


Nursing Triage Summary:  


Pt reports yesterday he developed funny sensation along lower back and leg 


numbness.  Pt went to the chiropractor and felt better.  Then around 1am he 

woke 


up to go to the bathroom and left leg felt numb and had difficulty walking.  Pt 


denies any known back problems.  Pt reports  he was bending and lifting alot on 


Saturday.





History of Present Illness


The patient is a 57 year old male who presents to the Emergency Room with 

complaints of left leg weakness for the past day who went to the chiropractor 

yesterday with minimal improvement of symptoms.  Patient states he did a lot of 

strenuous activity on Saturday.  Patient states for the past few days he has 

been doing a lot of sitting in the car which also causes problems with his 

back.  Patient states his left leg feels weak and has difficulty using it.  

Patient denies recent illness, bilateral leg weakness, chest pain, dyspnea, 

fever, chills, loss of bowel or bladder control, saddle anesthesia, IV drug 

abuse, back pain.  No injury to the area.  Patient is able to urinate and 

defecate without issues.





Review of Systems


An 10 system review of systems was completed with positives and pertinent 

negatives listed in the HPI.





Past Medical/Surgical History


Medical Problems:


(1) Left leg weakness


Kidney stones





Social History


Smoking Status:  Never Smoker


Smokeless Tobacco Use:  No


Drug Use:  none


Marital Status:  


Housing Status:  lives with family


Occupation Status:  employed





Current/Historical Medications


Scheduled


Ascorbic Acid (Ascorbic Acid), 500 MG PO DAILY


Fish Oil (Omega-3), 1 CAP PO DAILY


Multivitamin (Multivitamin), 1 TAB PO DAILY





Physical Exam


Vital Signs











  Date Time  Temp Pulse Resp B/P (MAP) Pulse Ox O2 Delivery O2 Flow Rate FiO2


 


3/22/18 11:14  68 18 130/72 98 Room Air  


 


3/22/18 09:32  67 20 137/62 97 Room Air  


 


3/22/18 08:09  66 18 127/82 98 Room Air  


 


3/22/18 07:27  76 18 129/72 95 Room Air  


 


3/22/18 06:00  62 16 152/88 96 Room Air  


 


3/22/18 05:41  64 18 146/79 98 Room Air  


 


3/22/18 05:37     99 Room Air  


 


3/22/18 05:15  78      


 


3/22/18 04:54 37.0 73 20 148/83 98 Room Air  











Physical Exam


VITALS: Vitals are noted on the nurse's note and reviewed by myself.  Vital 

signs stable.


GENERAL: Pleasant male, in no acute distress, nondiaphoretic, well-developed 

well-nourished.


SKIN: The skin was without rashes, erythema, edema, or bruising.  There is no 

tenting of the skin.  Capillary reflex less than 2 seconds.


HEAD: Normocephalic atraumatic.  


EARS: External auditory canals clear, tympanic membranes pearly gray without 

erythema or effusion bilaterally.


EYES: Pupils equal round and reactive to light and accommodation.  Conjunctivae 

without injection, sclerae without icterus.  Extraocular movements intact.  


NOSE: Patent, turbinates without inflammation or discharge.  


MOUTH: Mucous membranes moist.   Pharynx without erythema or exudate.  Uvula 

midline.  Airway patent.  Tongue does not deviate.  


NECK: Supple without nuchal rigidity.  No lymphadenopathy.  No thyromegaly.  

Cervical spine is nontender.  No JVD.


HEART: Regular rate and rhythm without murmurs gallops or rubs.


LUNGS: Clear to auscultation bilaterally without wheezes, rales or rhonchi.  No 

retractions or accessory muscle use.


ABDOMEN: Positive bowel sounds x 4.  Normal tympanic percussion.  Soft, 

nontender, without masses or organomegaly.  Sotelo sign negative.  No guarding 

or rebound tenderness. No CVA tenderness


MUSCULOSKELETAL: No muscle atrophy, erythema, or edema noted.  No thoracic or 

lumbar tenderness on exam, 5 out of 5 strength in the right leg, 4/5 strength 

in the left leg.  Patellar reflexes +2 equal and present bilaterally.


NEURO: Patient was alert and oriented to person place and time.  Normal 

sensation to light and sharp touch.  Patient drags his left leg when he 

ambulates. no other focal  neurological deficits.





Medical Decision & Procedures


Laboratory Results











Test


  3/22/18


05:10 3/22/18


09:28


 


RDW Standard Deviation


  40.4 fL


(36.4-46.3) 


 


 


RDW Coefficient of Variation


  12.7 %


(11.5-14.5) 


 


 


White Blood Count


  5.89 K/uL


(4.8-10.8) 


 


 


Red Blood Count


  5.54 M/uL


(4.7-6.1) 


 


 


Hemoglobin


  17.1 g/dL


(14.0-18.0) 


 


 


Hematocrit 47.6 % (42-52)  


 


Mean Corpuscular Volume


  85.9 fL


() 


 


 


Mean Corpuscular Hemoglobin


  30.9 pg


(25-34) 


 


 


Mean Corpuscular Hemoglobin


Concent 35.9 g/dl


(32-36) 


 


 


Platelet Count


  144 K/uL


(130-400) 


 


 


Mean Platelet Volume


  9.5 fL


(7.4-10.4) 


 


 


Neutrophils (%) (Auto) 65.4 %  


 


Lymphocytes (%) (Auto) 21.9 %  


 


Monocytes (%) (Auto) 10.5 %  


 


Eosinophils (%) (Auto) 1.2 %  


 


Basophils (%) (Auto) 0.8 %  


 


Neutrophils # (Auto)


  3.85 K/uL


(1.4-6.5) 


 


 


Lymphocytes # (Auto)


  1.29 K/uL


(1.2-3.4) 


 


 


Monocytes # (Auto)


  0.62 K/uL


(0.11-0.59) 


 


 


Eosinophils # (Auto)


  0.07 K/uL


(0-0.5) 


 


 


Basophils # (Auto)


  0.05 K/uL


(0-0.2) 


 


 


Immature Granulocyte % (Auto) 0.2 %  


 


Immature Granulocyte # (Auto)


  0.01 K/uL


(0.00-0.02) 


 


 


Erythrocyte Sedimentation Rate 4 mm/hr (0-14)  


 


Prothrombin Time


  10.7 SECONDS


(9.0-12.0) 


 


 


Prothromb Time International


Ratio 1.0 (0.9-1.1) 


  


 


 


Activated Partial


Thromboplast Time 24.7 SECONDS


(21.0-31.0) 


 


 


Partial Thromboplastin Ratio 1.0  


 


Est Creatinine Clear Calc


Drug Dose 116.2 ml/min 


  


 


 


Magnesium Level


  2.3 mg/dl


(1.8-2.4) 


 


 


Total Bilirubin


  0.6 mg/dl


(0.2-1) 


 


 


Direct Bilirubin


  0.1 mg/dl


(0-0.2) 


 


 


Aspartate Amino Transf


(AST/SGOT) 17 U/L (15-37) 


  


 


 


Alanine Aminotransferase


(ALT/SGPT) 24 U/L (12-78) 


  


 


 


Alkaline Phosphatase


  87 U/L


() 


 


 


Total Creatine Kinase


  222 U/L


() 


 


 


Creatine Kinase MB


  2.8 ng/ml


(0.5-3.6) 


 


 


Creatine Kinase MB Ratio 1.3 (0-3.0)  


 


Troponin I


  < 0.015 ng/ml


(0-0.045) 


 


 


C-Reactive Protein


  < 0.29 mg/dl


(0-0.29) 


 


 


Total Protein


  7.8 gm/dl


(6.4-8.2) 


 


 


Albumin


  4.3 gm/dl


(3.4-5.0) 


 


 


Thyroid Stimulating Hormone


(TSH) 2.760 uIu/ml


(0.300-4.500) 


 


 


Rapid Plasma Reagin


  NONREACTIVE


(NONREACT) 


 


 


Lyme Disease IgG Antibody NEG (NEG)  


 


Lyme Disease IgM Antibody NEG (NEG)  


 


Urine Color  YELLOW 


 


Urine Appearance  CLEAR (CLEAR) 


 


Urine pH  7.5 (4.5-7.5) 


 


Urine Specific Gravity


  


  1.012


(1.000-1.030)


 


Urine Protein  NEG (NEG) 


 


Urine Glucose (UA)  NEG (NEG) 


 


Urine Ketones  NEG (NEG) 


 


Urine Occult Blood  NEG (NEG) 


 


Urine Nitrite  NEG (NEG) 


 


Urine Bilirubin  NEG (NEG) 


 


Urine Urobilinogen  NEG (NEG) 


 


Urine Leukocyte Esterase  NEG (NEG) 











Medications Administered











 Medications


  (Trade)  Dose


 Ordered  Sig/Angel Luis


 Route  Start Time


 Stop Time Status Last Admin


Dose Admin


 


 Sodium Chloride  1,000 ml @ 


 50 mls/hr  Q20H


 IV  3/22/18 05:12


 3/22/18 13:43 DC 3/22/18 12:45


50 MLS/HR











ED Course


Prior records/ancillary studies reviewed and summarized above.


Nursing notes reviewed.


Additional history obtained from family


The patient's history was concerning for left leg weakness.





Differential diagnosis:


Etiologies such as metabolic, infection, hypo/hyperglycemia, electrolyte 

abnormalities, cardiac sources, intracerebral event, toxicologic, neurologic, 

conus medullaris syndrome, cauda equina syndrome, epidural abscess, epidural 

hematoma, fracture, subluxation, UTI, pyelonephritis, strain, muscular spasm  

As well as others were entertained. 





Physical examination:


As above.


ER treatment provided:


IV Lock


 stroke scale of 1


On reassessment the patient felt better.





Diagnostics interpretation by me:


ECG: Normal sinus, normal intervals, no acute ST-T wave changes.  Impression 

normal sinus rhythm interpreted by myself


The labs revealed stable H&H


Imaging studies:


Head CT negative intracranial bleed or fracture per radiology


MRI pending of the spine








Exam and history seem consistent with left leg weakness.


By the evaluation outlined above emergent etiologies such as infection, 

electrolyte abnormalities, cardiac sources, intracerebral event, toxologic,  

abnormalities blood glucose, metabolic, as well as others were deemed 

relatively unlikely. 








Case is signed out to JEYSON Baldwin, pending MRI and reevaluation in stable 

condition.





Case reviewed with my attending





The chart was completed utilizing Dragon Speech voice recognition software.   

Grammatical errors, random word insertions, pronoun errors, and incomplete 

sentences are an occassional consequence of this system due to software 

limitations, ambient noise, and hardware issues.  Any formal questions or 

concerns about the content, text, or information contained within the body of 

this dictation should be directly addressed to the physician assistant for 

clarification.





Medical Decision


As above





Medication Reconcilliation


Current Medication List:  was personally reviewed by me





Blood Pressure Screening


Patient's blood pressure:  Normal blood pressure





Impression





 Primary Impression:  


 Left leg weakness





Time Last Known Well


Greater than 24 hours





Stroke t-PA Criteria Reviewed


Does NOT meet criteria for t-PA





Reason t-PA Not Given


Treatment not indicated





Departure Information


Referrals


Carlos Justice M.D. (PCP)





Patient Instructions


My [facility]

## 2018-03-23 NOTE — DIAGNOSTIC IMAGING REPORT
MRI OF THE BRAIN WITHOUT AND WITH IV CONTRAST



CLINICAL HISTORY: C6-7 cord lesion, look for other CNS lesions    



COMPARISON STUDY:  MRI the cervical spine dated 3/22/2018 



TECHNIQUE: MRI of the brain was performed from the vertex to the skull base

utilizing various T1 and T2 weighted sequences. Following the IV administration

of 13 mL of Gadavist contrast, additional enhanced images were obtained.



FINDINGS: 

Sagittal T1, axial diffusion, proton density and T2 weighted axial, coronal

FLAIR, and pre and post axial T1-weighted images were acquired. These were

supplemented with post gadolinium coronal T1 weighted images. 

No intra or extra-axial mass lesions are visualized.

Axial diffusion-weighted images reveal no evidence of acute or subacute

infarction.

There is no evidence of ventricular dilatation.

Proton density T2-weighted and FLAIR images reveal a few tiny foci of increased

T2 signal within the white matter, likely on a small vessel basis.

There are no abnormal flow voids.

There is no evidence of pathologic enhancement.





IMPRESSION:  

1. No acute intracranial findings

2. No evidence of acute or subacute infarction

3. No evidence of intracranial mass

4. There are few tiny foci of increased T2 signal within the white matter,

likely on a small vessel basis.







Electronically signed by:  Ramiro Vasquez M.D.

3/23/2018 11:06 AM



Dictated Date/Time:  3/23/2018 11:03 AM

## 2018-03-24 VITALS
TEMPERATURE: 98.24 F | HEART RATE: 70 BPM | OXYGEN SATURATION: 97 % | DIASTOLIC BLOOD PRESSURE: 76 MMHG | SYSTOLIC BLOOD PRESSURE: 137 MMHG

## 2018-03-24 VITALS
DIASTOLIC BLOOD PRESSURE: 76 MMHG | TEMPERATURE: 98.24 F | OXYGEN SATURATION: 97 % | SYSTOLIC BLOOD PRESSURE: 137 MMHG | HEART RATE: 70 BPM

## 2018-03-24 LAB
BUN SERPL-MCNC: 18 MG/DL (ref 7–18)
CALCIUM SERPL-MCNC: 9.2 MG/DL (ref 8.5–10.1)
CO2 SERPL-SCNC: 24 MMOL/L (ref 21–32)
CREAT SERPL-MCNC: 1.08 MG/DL (ref 0.6–1.4)
EOSINOPHIL NFR BLD AUTO: 161 K/UL (ref 130–400)
GLUCOSE SERPL-MCNC: 144 MG/DL (ref 70–99)
HCT VFR BLD CALC: 48 % (ref 42–52)
HGB BLD-MCNC: 17.1 G/DL (ref 14–18)
MCH RBC QN AUTO: 30.2 PG (ref 25–34)
MCHC RBC AUTO-ENTMCNC: 35.6 G/DL (ref 32–36)
MCV RBC AUTO: 84.8 FL (ref 80–100)
PMV BLD AUTO: 9.9 FL (ref 7.4–10.4)
POTASSIUM SERPL-SCNC: 4.1 MMOL/L (ref 3.5–5.1)
RED CELL DISTRIBUTION WIDTH CV: 12.6 % (ref 11.5–14.5)
RED CELL DISTRIBUTION WIDTH SD: 38.7 FL (ref 36.4–46.3)
SODIUM SERPL-SCNC: 137 MMOL/L (ref 136–145)
WBC # BLD AUTO: 11.17 K/UL (ref 4.8–10.8)

## 2018-03-24 RX ADMIN — Medication SCH GM: at 08:25

## 2018-03-24 RX ADMIN — METHYLPREDNISOLONE SODIUM SUCCINATE SCH MLS/HR: 1 INJECTION, POWDER, FOR SOLUTION INTRAMUSCULAR; INTRAVENOUS at 04:39

## 2018-03-24 RX ADMIN — METHYLPREDNISOLONE SODIUM SUCCINATE SCH MLS/HR: 1 INJECTION, POWDER, FOR SOLUTION INTRAMUSCULAR; INTRAVENOUS at 08:25

## 2018-03-24 RX ADMIN — OXYCODONE HYDROCHLORIDE AND ACETAMINOPHEN SCH MG: 500 TABLET ORAL at 08:25

## 2018-03-24 RX ADMIN — Medication SCH TAB: at 08:25

## 2018-03-24 NOTE — DISCHARGE INSTRUCTIONS
Discharge Instructions


Date of Service


Mar 24, 2018.





Admission


Reason for Admission:  Left leg weakness, numbness in various locations





Discharge


Discharge Diagnosis / Problem:  Left leg and left hand weakness, likely due to 

DJD in neck/low back





Discharge Goals


Goal(s):  Learn about illness, Diagnostic testing, Therapeutic intervention





Activity Recommendations


Activity Limitations:  as noted below





Until you are seen by Dr. Carrington and/or his assistant please -





1.  avoid heavy lifting over 20 pounds


2.  avoid heavy household or outdoor chores that involve lifting, pulling, 

pushing, etc. 


3.  avoid going to the gym; attending physical therapy is fine 


.





Instructions / Follow-Up


Instructions / Follow-Up





From Dr. Moran -





You had an extensive work-up for your left leg weakness and numbness.


Note that your MRI brain did not show any stroke, tumor, etc. 


Your lumbar puncture showed no evidence of infection or inflammation. 


You have a considerable amount of DJD (degenerative joint disease) in your neck

, low back, as well as the middle of your back from wear & tear over the years 

from your job.  





You were seen in consult by Dr. Carrington from neurology who felt that the DJD was 

causing your symptoms, especially from the neck.  


He recommended IV steroids while hospitalized and a course of steroid after you 

leave the hospital. 


Your symptoms improved with time and with the steroids. 





There is low suspicion that you have multiple sclerosis, lyme's disease, or 

other inflammatory neurological disorders. 








At this time we recommend the following - 





1.  take a decadron (dexamethasone) steroid taper for 8 days.  Start this 

TOMORROW on Sunday, 3/15/18.  Take the steroid with food. 





2.  while on the dexamethasone please take omeprazole 40mg once a day in the 

morning for 10 days.  This will help prevent stomach irritation from all the 

steroids.





3.  while on the dexamethasone please STOP your alleve.  You can take tylenol 

as needed for additional pain relief, if necessary. 





4.  ok to start a PT/OT program; prescription provided; ok to use [facility].  





5.  see separate section regarding lifting and activity restrictions.





6.  follow-up with Dr. Carrington's office in early April as scheduled.  





7.  see your PCP - Dr. Paulo Reveles - THIS WEEK for hospital follow-up.  Please 

call his office to schedule this.  








Reasons to return to [facility] - 


* worsening weakness of any limb


* recurrent numbness or tingling 


* difficulty with your bowels or bladder (specifically - incontinence)


* any other concerns





Current Hospital Diet


Patient's current hospital diet: AHA Diet (Heart Healthy)





Discharge Diet


Recommended Diet:  Regular Diet





Procedures


Procedures Performed:  


1.  lumbar puncture - normal


2.  MRI head - normal


3.  MRI cervical spine (neck) - significant DJD (Degenerative joint


disease)


4.  MRI thoracic spine (middle of the back) - again DJD seen


5.  MRI lumbar spine (low back) - mild DJD seen





Pending Studies


Studies pending at discharge:  yes


List of pending studies:  


various studies from the lumbar puncture including tests to rule out


multiple sclerosis (MS)





Medical Emergencies








.


Who to Call and When:





Medical Emergencies:  If at any time you feel your situation is an emergency, 

please call 911 immediately.





.





Non-Emergent Contact


Non-Emergency issues call your:  Neurologist


Call Non-Emergent contact if:  your pain is not controlled, your pain is 

worsening, your pain is unusual for you, your pain is concerning you, you have 

any medication questions


You notice worsening weakness of the arms, hands, legs, or feet.  


You notice recurrent numbness in any limb or any portion of your body.


You have difficulty walking or your walking is getting worse.  


You have difficulty or loss of bowel/bladder control (incontinence). 


.


.








"Provider Documentation" section prepared by Nirav Moran.








.

## 2018-03-24 NOTE — NEUROLOGY PROGRESS NOTES
Neurology Progress Note


Date of Service


Mar 24, 2018.





Subjective


Patient believes that his left hand is feeling better.  There is less weakness.

  His legs feel stronger as well.  It is still little bit weak.  He can walk 

low he limps a little bit.  He has much less left forearm pain and tenderness. 

He is tolerating the steroids well.





Nursing reports no new events or issues overnight.





MRI of the brain was largely unremarkable with no significant issues or stroke.

  There were a very few number of tiny, nonspecific old white matter spots.





Lumbar puncture showed 0 white cells, 2 red cells, protein of 49, negative Gram 

stain, negative cryptococcal antigen.  Cultures are pending and special protein 

studies and other inflammatory markers including Lyme disease are pending as 

well.





Laboratory studies have been unremarkable so far.





Objective











  Date Time  Temp Pulse Resp B/P (MAP) Pulse Ox O2 Delivery O2 Flow Rate FiO2


 


3/24/18 07:39 36.8 70 18 137/76 (96) 97 Room Air  


 


3/24/18 00:00      Room Air  


 


3/23/18 22:34 36.8 71 20 129/69 (89) 96 Room Air  


 


3/23/18 16:00      Room Air  


 


3/23/18 15:00 37.0 62 16 126/82 (97) 97 Room Air  


 


3/23/18 14:45  68 16 124/80 (95) 96 Room Air  


 


3/23/18 14:30  70 16 118/77 (91) 96 Room Air  


 


3/23/18 14:15 37.0 73 16 160/81 (107) 96 Room Air  











Last 24 Hours








Test


  3/23/18


13:58 3/23/18


14:18 3/23/18


17:09 3/23/18


19:46


 


CSF Color COLORLESS    


 


CSF Appearance CLEAR    


 


CSF WBC 0 /uL    


 


CSF RBC 2 /uL    


 


CSF Xanthrochromic


  NO


XANTHOCHROMIA 


  


  


 


 


CSF Cell Count Tube # 3    


 


CSF Chemistry Tube # 1    


 


CSF Glucose 59 mg/dl    


 


CSF Total Protein 49.0 mg/dl    


 


Bedside Glucose   92 mg/dl  121 mg/dl 


 


Test


  3/24/18


07:46 3/24/18


08:38 


  


 


 


Bedside Glucose 143 mg/dl    


 


White Blood Count  11.17 K/uL   


 


Red Blood Count  5.66 M/uL   


 


Hemoglobin  17.1 g/dL   


 


Hematocrit  48.0 %   


 


Mean Corpuscular Volume  84.8 fL   


 


Mean Corpuscular Hemoglobin  30.2 pg   


 


Mean Corpuscular Hemoglobin


Concent 


  35.6 g/dl 


  


  


 


 


RDW Standard Deviation  38.7 fL   


 


RDW Coefficient of Variation  12.6 %   


 


Platelet Count  161 K/uL   


 


Mean Platelet Volume  9.9 fL   








Exam:


He is awake and alert.  Speech is without aphasia or dysarthria.  Extraocular 

eye muscles are intact without nystagmus.  There is no facial droop.  Tongue is 

midline. He can walk but he limps favoring the left leg.  He is narrow based.  

With outstretched arms, there is no drift.  There is no resting, postural, or 

action tremor bilaterally.  There is no ataxia finger-to-nose testing. Motor 

strength is 5/5 diffusely in all major muscle groups in arms and legs with 

proximally distally, except there is some slight  strength and intrinsic 

hand muscle weakness on the left (4+/5) and very mild weakness in the left leg 

in the hip flexors (4/5).  This is similar to yesterday but slightly improved.





Current Inpatient Medications








 Medications


  (Trade)  Dose


 Ordered  Sig/Angel Luis


 Route  Start Time


 Stop Time Status Last Admin


Dose Admin


 


 Acetaminophen


  (Tylenol Tab)  650 mg  Q4H  PRN


 PO  3/22/18 11:30


 4/21/18 11:29   


 


 


 Polyethylene


  (Miralax Powder


 Packet)  17 gm  DAILY  PRN


 PO  3/22/18 11:30


 4/21/18 11:29  3/24/18 08:35


17 GM


 


 Miscellaneous


  (Iv Fluids


 Completed)  1 ea  PRN  PRN


 N/A  3/22/18 16:30


 3/22/19 16:29   


 


 


 Ascorbic Acid


  (Vitamin C Tab)  500 mg  DAILY


 PO  3/23/18 09:30


 4/22/18 09:29  3/24/18 08:25


500 MG


 


 Fish Oil


  (Omega-3


  (Purified Fish


 Oil) Cap)  1 gm  DAILY


 PO  3/23/18 09:30


 4/22/18 09:29  3/24/18 08:25


1 GM


 


 Multivitamins


  (Multivitamin


 Tab)  1 tab  DAILY


 PO  3/23/18 09:30


 4/22/18 09:29  3/24/18 08:25


1 TAB


 


 Gadobutrol


  (Gadavist)  13 mmol  UD  PRN


 IV  3/23/18 10:45


 3/27/18 10:44   


 


 


 Methylprednisolone


 Sodium Succinate


 250 mg/Dextrose  104 ml @ 


 208 mls/hr  Q6H


 IV  3/23/18 16:00


 3/24/18 10:29  3/24/18 08:25


208 MLS/HR


 


 Pantoprazole


 Sodium


  (Protonix Tab)  40 mg  QAM


 PO  3/24/18 08:00


 3/28/18 07:59  3/24/18 08:09


40 MG











Impression


1. Acute onset left lower extremity weakness with low back pain.





Currently, he has no low back pain.


I believe the patient has a mid lumbar radiculopathy giving him proximal left 

leg weakness.  This is improving some.


Imaging studies shows no surgical lesion but he has diffuse degenerative 

changes of disc in bone in the thoracic and lumbar spine.





2.  Significant cervical spinal stenosis at C4-5 secondary to disc in bone.





Cord is impinged but not compressed.  There are other degenerative changes and 

there is significant right greater than left neural foraminal stenosis 

diffusely bilaterally


The left hand is weaker than the right which I suspect is a left C8 

radiculopathy.  He is making some improvements of this already.





3. 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





The etiology of this lesion is not clear.  An inflammatory process cannot be 

excluded including Lyme disease.  He has no history to suggest multiple 

sclerosis or any other acute infectious/inflammatory process.  This does not 

look like transverse myelitis.


There could be some encephalomalacia or mechanical aspect of this lesion from 

previous injury but there is no gross atrophy noted.





He has no upper motor neuron signs in the lower extremities otherwise.  I do 

not believe he has an acute myelopathy.  The the quadriceps reflexes are brisk 

but symmetrical. The more distal reflexes are not as brisk and there are no toe 

extensor signs or clonus present.


MRI of the brain is unremarkable and he has no white matter lesions of 

significance.  He has some very tiny nonspecific old small vessel ischemic 

changes consistent with minimal vascular change.





Overall, I suspect this lesion is old and, so far, his lumbar puncture is 

unremarkable with no signs of infection or inflammation.





Plan


1.  Physical therapy for strengthening of the left leg and hand





2. Avoid any stress to the cervical spine (no chiropractic manipulation and no 

heavy lifting at work).





3. Finish 1 gram IV Solu-Medrol and follow this with a 1 week tapering course 

of steroids for anti-inflammatory effect.





4.  I would hold off on surgical consultation for cervical spine at this time.





I would like to follow up as an outpatient in the next 1-2 weeks.





Please contact me if I can be of further assistance on this case.

## 2018-03-28 NOTE — DISCHARGE SUMMARY
Discharge Summary


Date of Service


Mar 28, 2018.





Discharge Summary


Admission Date:


Mar 23, 2018 at 09:25


Discharge Date:  Mar 24, 2018


Discharge Disposition:  Home


Principal Diagnosis:  severe DJD of the cervical spine


Problems/Secondary Diagnoses:


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


abnormal imaging of the cervical spine


Immunizations:  


   Have You Had Influenza Vaccine:  Yes


   History of Tetanus Vaccine?:  Yes


   History of Pneumococcal:  No


   History of Hepatitis B Vaccine:  Yes


Procedures:


MRI brain -


1. No acute intracranial findings


2. No evidence of acute or subacute infarction


3. No evidence of intracranial mass


4. There are few tiny foci of increased T2 signal within the white matter, 

likely on a small vessel basis.








MRI cervical spine - 


IMPRESSION:  


1. Limited study technically due to considerable patient motion.


2. Moderate degenerative disc change throughout the entire cervical region.


3. Multilevel broad-based bulging discs with a right central posterior disc


herniation at C4-C5 and C6-C7.


4. Considerable narrowing of multilevel right and to lesser extent left neural


foramina as described.


5. Focus of increased signal left cervical cord C6-C7 measuring 6 x 3.7 mm.


6. This shows no abnormal postcontrast enhancement.


7. Differential considerations must include a demyelinating disorder, versus the


possibility of cord encephalomalacia due to a variety of entities. Transverse


myelitis is considered less likely.








MRI thoracic spine - 


IMPRESSION:  


Moderate degenerative disc change throughout the entire thoracic region. No


major compromise of the spinal canal or neural foramina. No abnormal


postcontrast enhancement. Minimal/mild multilevel disc bulges 








MRI lumbar spine - 


IMPRESSION: 


1. Mild multilevel spondylitic changes. No evidence of significant spinal or


foraminal stenosis.


2. Severe left-sided hydronephrosis, possibly secondary to a long-standing UPJ


obstruction


3. Rapid tapering of the thecal sac at the S1 level, likely developmental








CT head - normal 





LP under fluoro-guidance by radiology


Consultations:


neurology - Valentino Carrington MD


PT, OT





Medication Reconciliation


New Medications:  


Dexamethasone (Decadron) 4 Mg Tab


4 MG PO AS DIRECTED, #13 TAB


start 3/25: take 1 tab TID x 2 dys, then 1 tab BID x 2 dys, then


 1 tab qd x 2 dys, then 1/2 tab x 2 dys.


Omeprazole (Prilosec) 40 Mg Cap


1 CAP PO QAM for 10 Days, #10 CAP 0 Refills





 


Continued Medications:  


Ascorbic Acid (Ascorbic Acid) 500 Mg Tab


500 MG PO DAILY, TAB





Fish Oil (Omega-3) 1 Ea Cap


1 CAP PO DAILY, CAP





Multivitamin (Multivitamin)  Tab


1 TAB PO DAILY, TAB











Discharge Exam


Physical Exam:  


   General Appearance:  no apparent distress, + obese


   ENT:  pharynx normal


   Neck:  no JVD


   Respiratory/Chest:  lungs clear, no respiratory distress, no accessory 

muscle use


   Cardiovascular:  regular rate, rhythm, no gallop, no murmur, normal 

peripheral pulses


   Abdomen / GI:  normal bowel sounds, non tender, soft, no organomegaly


   Extremities:  no pedal edema


   Neurologic/Psychiatric:  alert, oriented x 3, + motor weakness (hip flexion, 

left, 4/5; all other muscle groups of LEs 5/5; handgrip, right, 5/5; handgrip, 

left 4-5/5)





Hospital Course


HISTORY OF PRESENT ILLNESS:


Mr. Joshua is a very pleasant 56yo male with no significant PMH who was doing 

a lot of outdoor work that involved twisting, bending and lifting which he is 

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 He 

does usually experience the feeling of legs "falling asleep" when he drives 

however it usually clears up after getting up out of the car. This time the 

feeling persisted. He had some relief after seeing a chiropractor yesterday. 

Numbness went from both legs to just left leg. He feels like his leg might give 

out when he walks. He really has no pain and has taking Aleve last night and 

yesterday. No loss of control of bowel or bladder. He is eating and drinking 

normally. 








HOSPITAL COURSE: 


The patient underwent an extensive radiological and biochemical work-up for his 

presenting neurological symptoms.  


He had normal/negative lyme's testing, TSH, B12, RPR, electrolytes, etc.  


MRI of the cervical, lumbar, and thoracic spines showed considerable DJD 

throughout the entire spine but especially the neck.  


In addition, there was a lesion at the C6-C7 level of undetermined significance

, but thought to be possibly chronic in nature.  





Dr. Valentino Carrington from neurology was consulted, and lumbar puncture was recommended. 


There was no evidence of any infectious or inflammatory condition as cell 

counts were normal and total protein level was largely normal.  


To be complete fluid was sent for oligoclonal bands to rule out MS.  





Following his negative work-up Dr. Carrington recommended IV solumedrol and by the 

next day most of his neurological symptoms were significantly improved. 


At discharge his paresthesias were resolved. 


He had only very mild, residual weakness in his left hand and had mild weakness 

with left hip flexion.  





At time of discharge it was felt that his symptoms were due to significant DJD 

of the spine.  





He was seen by PT prior to discharge and did well from a PT standpoint.


With that said outpatient PT was recommended for his symptoms and to help 

strengthen his hand/leg.  





At discharge a steroid taper was advised, and he will have close neurological 

follow-up with Dr. Carrington's office.


Total Time Spent:  Greater than 30 minutes


This includes examination of the patient, discharge planning, medication 

reconciliation, and communication with other providers.





Discharge Instructions


Please refer to the electronic Patient Visit Report (Discharge Instructions) 

for additional information.





Follow-Up


[facility] - Dali Mir PA-C - Wednesday April 4th at 2:00 pm





Additional Copies To


Carlos Justice M.D.; Dali Mir; KYLE Carrington M.D.